# Patient Record
Sex: MALE | Race: BLACK OR AFRICAN AMERICAN | NOT HISPANIC OR LATINO | URBAN - METROPOLITAN AREA
[De-identification: names, ages, dates, MRNs, and addresses within clinical notes are randomized per-mention and may not be internally consistent; named-entity substitution may affect disease eponyms.]

---

## 2022-05-21 ENCOUNTER — EMERGENCY (EMERGENCY)
Facility: HOSPITAL | Age: 42
LOS: 1 days | Discharge: HOME | End: 2022-05-21
Admitting: EMERGENCY MEDICINE
Payer: SELF-PAY

## 2022-05-21 VITALS
HEART RATE: 63 BPM | OXYGEN SATURATION: 98 % | DIASTOLIC BLOOD PRESSURE: 96 MMHG | TEMPERATURE: 98 F | RESPIRATION RATE: 18 BRPM | SYSTOLIC BLOOD PRESSURE: 171 MMHG

## 2022-05-21 VITALS
RESPIRATION RATE: 18 BRPM | HEIGHT: 69 IN | HEART RATE: 75 BPM | TEMPERATURE: 98 F | SYSTOLIC BLOOD PRESSURE: 158 MMHG | WEIGHT: 154.98 LBS | DIASTOLIC BLOOD PRESSURE: 102 MMHG | OXYGEN SATURATION: 97 %

## 2022-05-21 DIAGNOSIS — Z71.51 DRUG ABUSE COUNSELING AND SURVEILLANCE OF DRUG ABUSER: ICD-10-CM

## 2022-05-21 DIAGNOSIS — R53.83 OTHER FATIGUE: ICD-10-CM

## 2022-05-21 DIAGNOSIS — R42 DIZZINESS AND GIDDINESS: ICD-10-CM

## 2022-05-21 DIAGNOSIS — Z00.00 ENCOUNTER FOR GENERAL ADULT MEDICAL EXAMINATION WITHOUT ABNORMAL FINDINGS: ICD-10-CM

## 2022-05-21 DIAGNOSIS — R11.2 NAUSEA WITH VOMITING, UNSPECIFIED: ICD-10-CM

## 2022-05-21 DIAGNOSIS — F10.20 ALCOHOL DEPENDENCE, UNCOMPLICATED: ICD-10-CM

## 2022-05-21 DIAGNOSIS — I10 ESSENTIAL (PRIMARY) HYPERTENSION: ICD-10-CM

## 2022-05-21 LAB
ALBUMIN SERPL ELPH-MCNC: 4.4 G/DL — SIGNIFICANT CHANGE UP (ref 3.4–5)
ALP SERPL-CCNC: 122 U/L — HIGH (ref 40–120)
ALT FLD-CCNC: 17 U/L — SIGNIFICANT CHANGE UP (ref 12–42)
ANION GAP SERPL CALC-SCNC: 14 MMOL/L — SIGNIFICANT CHANGE UP (ref 9–16)
AST SERPL-CCNC: 34 U/L — SIGNIFICANT CHANGE UP (ref 15–37)
BASOPHILS # BLD AUTO: 0.05 K/UL — SIGNIFICANT CHANGE UP (ref 0–0.2)
BASOPHILS NFR BLD AUTO: 0.6 % — SIGNIFICANT CHANGE UP (ref 0–2)
BILIRUB SERPL-MCNC: 0.4 MG/DL — SIGNIFICANT CHANGE UP (ref 0.2–1.2)
BUN SERPL-MCNC: 8 MG/DL — SIGNIFICANT CHANGE UP (ref 7–23)
CALCIUM SERPL-MCNC: 8.6 MG/DL — SIGNIFICANT CHANGE UP (ref 8.5–10.5)
CHLORIDE SERPL-SCNC: 101 MMOL/L — SIGNIFICANT CHANGE UP (ref 96–108)
CO2 SERPL-SCNC: 23 MMOL/L — SIGNIFICANT CHANGE UP (ref 22–31)
CREAT SERPL-MCNC: 0.94 MG/DL — SIGNIFICANT CHANGE UP (ref 0.5–1.3)
EGFR: 103 ML/MIN/1.73M2 — SIGNIFICANT CHANGE UP
EOSINOPHIL # BLD AUTO: 0.02 K/UL — SIGNIFICANT CHANGE UP (ref 0–0.5)
EOSINOPHIL NFR BLD AUTO: 0.2 % — SIGNIFICANT CHANGE UP (ref 0–6)
ETHANOL SERPL-MCNC: 176 MG/DL — HIGH
GLUCOSE SERPL-MCNC: 76 MG/DL — SIGNIFICANT CHANGE UP (ref 70–99)
HCT VFR BLD CALC: 39.3 % — SIGNIFICANT CHANGE UP (ref 39–50)
HGB BLD-MCNC: 13.3 G/DL — SIGNIFICANT CHANGE UP (ref 13–17)
IMM GRANULOCYTES NFR BLD AUTO: 0.3 % — SIGNIFICANT CHANGE UP (ref 0–1.5)
LYMPHOCYTES # BLD AUTO: 1.16 K/UL — SIGNIFICANT CHANGE UP (ref 1–3.3)
LYMPHOCYTES # BLD AUTO: 13.5 % — SIGNIFICANT CHANGE UP (ref 13–44)
MAGNESIUM SERPL-MCNC: 2.1 MG/DL — SIGNIFICANT CHANGE UP (ref 1.6–2.6)
MCHC RBC-ENTMCNC: 31.8 PG — SIGNIFICANT CHANGE UP (ref 27–34)
MCHC RBC-ENTMCNC: 33.8 GM/DL — SIGNIFICANT CHANGE UP (ref 32–36)
MCV RBC AUTO: 94 FL — SIGNIFICANT CHANGE UP (ref 80–100)
MONOCYTES # BLD AUTO: 0.64 K/UL — SIGNIFICANT CHANGE UP (ref 0–0.9)
MONOCYTES NFR BLD AUTO: 7.4 % — SIGNIFICANT CHANGE UP (ref 2–14)
NEUTROPHILS # BLD AUTO: 6.72 K/UL — SIGNIFICANT CHANGE UP (ref 1.8–7.4)
NEUTROPHILS NFR BLD AUTO: 78 % — HIGH (ref 43–77)
NRBC # BLD: 0 /100 WBCS — SIGNIFICANT CHANGE UP (ref 0–0)
PLATELET # BLD AUTO: 224 K/UL — SIGNIFICANT CHANGE UP (ref 150–400)
POTASSIUM SERPL-MCNC: 4 MMOL/L — SIGNIFICANT CHANGE UP (ref 3.5–5.3)
POTASSIUM SERPL-SCNC: 4 MMOL/L — SIGNIFICANT CHANGE UP (ref 3.5–5.3)
PROT SERPL-MCNC: 8.6 G/DL — HIGH (ref 6.4–8.2)
RBC # BLD: 4.18 M/UL — LOW (ref 4.2–5.8)
RBC # FLD: 13 % — SIGNIFICANT CHANGE UP (ref 10.3–14.5)
SARS-COV-2 RNA SPEC QL NAA+PROBE: SIGNIFICANT CHANGE UP
SODIUM SERPL-SCNC: 138 MMOL/L — SIGNIFICANT CHANGE UP (ref 132–145)
TROPONIN I, HIGH SENSITIVITY RESULT: 8.2 NG/L — SIGNIFICANT CHANGE UP
WBC # BLD: 8.62 K/UL — SIGNIFICANT CHANGE UP (ref 3.8–10.5)
WBC # FLD AUTO: 8.62 K/UL — SIGNIFICANT CHANGE UP (ref 3.8–10.5)

## 2022-05-21 PROCEDURE — 99285 EMERGENCY DEPT VISIT HI MDM: CPT

## 2022-05-21 PROCEDURE — 93010 ELECTROCARDIOGRAM REPORT: CPT

## 2022-05-21 RX ORDER — ONDANSETRON 8 MG/1
4 TABLET, FILM COATED ORAL ONCE
Refills: 0 | Status: COMPLETED | OUTPATIENT
Start: 2022-05-21 | End: 2022-05-21

## 2022-05-21 RX ORDER — SODIUM CHLORIDE 9 MG/ML
1000 INJECTION INTRAMUSCULAR; INTRAVENOUS; SUBCUTANEOUS ONCE
Refills: 0 | Status: COMPLETED | OUTPATIENT
Start: 2022-05-21 | End: 2022-05-21

## 2022-05-21 RX ADMIN — SODIUM CHLORIDE 1000 MILLILITER(S): 9 INJECTION INTRAMUSCULAR; INTRAVENOUS; SUBCUTANEOUS at 14:28

## 2022-05-21 RX ADMIN — SODIUM CHLORIDE 1000 MILLILITER(S): 9 INJECTION INTRAMUSCULAR; INTRAVENOUS; SUBCUTANEOUS at 14:31

## 2022-05-21 NOTE — ED PROVIDER NOTE - CLINICAL SUMMARY MEDICAL DECISION MAKING FREE TEXT BOX
alcohol abuse, here with nausea, vomiting, hot and cold sweats and fatigue earlier, now resolved, well appearing, NAD, does not appear to be in alcohol withdrawal, symptoms may be due to caffeine ingestion as patient does not usually drink coffee. will check ekg, labs, IVF, reassess.

## 2022-05-21 NOTE — ED PROVIDER NOTE - DURATION
Chief Complaint   Patient presents with     New Patient     PRE LUNG EVAL     Vitals were taken and medications were reconciled.  MAJO Bianchi  12:29 PM    
0.5/hour(s)

## 2022-05-21 NOTE — ED PROVIDER NOTE - PHYSICAL EXAMINATION
CONSTITUTIONAL: Well-appearing; well-nourished; in no apparent distress.   	HEAD: Normocephalic; atraumatic.   	EYES:  conjunctiva and sclera clear  	ENT: normal nose; no rhinorrhea; normal pharynx with no erythema or lesions.   	NECK: Supple; non-tender;   	CARDIOVASCULAR: Normal S1, S2; no murmurs, rubs, or gallops. Regular rate and rhythm.   	RESPIRATORY: Breathing easily; breath sounds clear and equal bilaterally; no wheezes, rhonchi, or rales.  	GI: Soft; non-distended; non-tender; no palpable organomegaly.   	EXT: No cyanosis or edema; N/V intact  	SKIN: Normal for age and race; warm; dry; good turgor; no apparent lesions or rash.   	NEURO: A & O x 3; face symmetric; grossly unremarkable.   PSYCHOLOGICAL: The patient’s mood and manner are appropriate. CONSTITUTIONAL: Well-appearing; well-nourished; in no apparent distress.   	HEAD: Normocephalic; atraumatic.   	EYES:  conjunctiva and sclera clear  	ENT: normal nose; no rhinorrhea; normal pharynx with no erythema or lesions.   	NECK: Supple; non-tender;   	CARDIOVASCULAR: Normal S1, S2; no murmurs, rubs, or gallops. Regular rate and rhythm.   	RESPIRATORY: Breathing easily; breath sounds clear and equal bilaterally; no wheezes, rhonchi, or rales.  	GI: Soft; non-distended; non-tender  	EXT: GARCIA  x 4. ambulatory.  	SKIN: Normal for age and race; warm; dry; good turgor; no apparent lesions or rash.   	NEURO: A & O x 3; face symmetric; grossly unremarkable.   PSYCHOLOGICAL: The patient’s mood and manner are appropriate.

## 2022-05-21 NOTE — SBIRT NOTE ADULT - NSSBIRTALCNOACTINTDET_GEN_A_CORE
PT declined referral to treatment and stated that he does not live in [facility] and will arrange inpatient detox treatment when he goes back to his hometown in [place].

## 2022-05-21 NOTE — ED PROVIDER NOTE - PROGRESS NOTE DETAILS
patient asking to be discharged as he is feeling better, asymptomatic. tolerating po. discussed all results with patient, advised f/u pmd for elevated bp, patient agrees with plan. return precautions discussed. will dc

## 2022-05-21 NOTE — ED PROVIDER NOTE - PATIENT PORTAL LINK FT
You can access the FollowMyHealth Patient Portal offered by [facility] by registering at the following website: [email or web address]. By joining [employer]’s FollowMyHealth portal, you will also be able to view your health information using other applications (apps) compatible with our system.

## 2022-05-21 NOTE — ED PROVIDER NOTE - NSFOLLOWUPINSTRUCTIONS_ED_ALL_ED_FT
Your blood pressure is high, please follow up with your doctor    Return for any concerning or worsening symptoms.

## 2022-05-21 NOTE — ED ADULT TRIAGE NOTE - CHIEF COMPLAINT QUOTE
Pt BIBEMS complaining of dizziness and chest palpitations that started after drinking a cold brew today. Pt admits to drinking alcohol this morning states he is and everyday drinker. Pt noted to be htn.

## 2022-05-21 NOTE — ED ADULT NURSE NOTE - OBJECTIVE STATEMENT
42 y/o male who had an episode of sob, dizziness, chills, shaking after drinking coffee- pt states he is an alcoholic and only drank 1.5 beers this morning- his usual is 15 a day- Pt arrives A+Ox3 denies chest pain, sob now- pt arrives with Left AC 18 gauge and NS infusing

## 2025-01-07 NOTE — ED PROVIDER NOTE - OBJECTIVE STATEMENT
Procedure Details  31 O  - RESIN-BASED COMPOSITE - 1 SURFACE, POSTERIOR  Patient presents for a dental restoration and verbally consents for treatment:  Reviewed health history-  Pt is ASA type II  Treatment consents signed: Yes  Perio: Healthy  Pain Scale: 0  Caries Assessment: Medium    Radiographs: Films are current  Oral Hygiene instruction reviewed and given  Hygiene recall visits recommended to the patient    Pt was told by  that his insurance will cover #31 crown but not the core build up. #31 has had two fillings dislodged over the course of 14 years (none were placed at [facility]).     Pulp testing showed N response for cold, percussion, and palpation for teeth #30, 31, 32.     After discussion w/ pt, decided to just fill #31-O w/ composite resin w/ added retention grooves for now. If this resin dislodges at any point, pt understands a crown will be absolutely necessary.     Procedure details:  Tooth #31  Dental Anesthesia:  2 carpules 2% Lidocaine w/ 1:100k epi (IANB right side)  Isolation achieved w/ DryShield, cotton rolls and high speed suction.  Three retention grooves added at line angles of prep using high speed handpiece.   Round bur on slow speed used to roughen surface of prep.  Hemaseal and Cide Desensitizer w/ 4% CHX applied to prep w/ microbrush and dried to disinfect.  Limelight applied to deepest part of prep and cured.  Etched and rinsed. Bonded and cured. Packable resin placed and cured.  Shade: Shade A2  Occlusion verified w/ articulating paper.    Pt aware that he needs to update us if #31 feels symptomatic or if the filling falls out.    Prognosis is Good..   Referrals Needed: No  NV: 6 month recall/prophy       40 y/o M with Hx of HTN not on medication and Hx of alcoholism, admits to drinking 12-13 beers a day for the past 28 years, presents to the ED complaining of fatigue, sweats, shaking, hot and cold flashes, nausea, and multiple episodes of vomiting that lasted approximately 0.5 hours prior to arrival. Patient with  at bedside who states they called EMS and decided to come to the ED due to elevated blood pressure in the field. Patient states he was drinking last night and his last drink was 1.5 cans of beer around 8 AM this morning. He also drank a cold brew coffee this morning and he normally does not drink coffee at all. He states he has never had symptoms like this in the past and denies ever being in EtOH withdrawal. Denies any associated chest pain or LOC but states he had some difficulty breathing. Denies any allergies. Patient is visiting from [place] for a convention. Patient states he is feeling a lot better now here in the ED. Patient is vaccinated for COVID. 40 y/o M with Hx of HTN not on medication and Hx of alcoholism, admits to drinking 12-13 beers a day for the past 28 years, presents to the ED complaining of fatigue, sweats, shaking, hot and cold flashes, nausea, and multiple episodes of vomiting that lasted approximately 30 minutes prior to arrival. Patient with  at bedside who states they called EMS and decided to come to the ED due to elevated blood pressure in the field. Patient states he was drinking last night and his last drink was 1.5 cans of beer around 8 AM this morning. He also drank a cold brew coffee this morning and he normally does not drink coffee at all. He states he has never had symptoms like this in the past and denies ever being in EtOH withdrawal. Denies any associated chest pain or LOC but states he had some difficulty breathing. Denies any allergies. Patient is visiting from [place] for a convention. Patient states he is feeling a lot better now here in the ED. Patient is vaccinated for COVID. 42 y/o M with Hx of HTN not on medication and Hx of alcoholism, admits to drinking 12-13 beers a day for the past 28 years, presents to the ED complaining of fatigue, shaking, hot and cold flashes, nausea, and NBNB vomiting that lasted approximately 30 minutes prior to arrival. Patient with  at bedside who states they called EMS and decided to come to the ED due to elevated blood pressure at the scene. Patient states he was drinking alcohol as per usual last night and his last drink was 1.5 cans of beer around 8 AM this morning. He also drank a cold brew coffee this morning and he normally does not drink coffee at all. He states he has never had symptoms like this in the past. Reports being tremulous when he does not drink alcohol, denies seizures or DT symptoms in the past.  Denies any associated chest pain or LOC/ Denies any allergies. Patient is visiting from [place] for a convention. Patient states he is feeling a lot better now here in the ED. Patient is vaccinated for COVID.